# Patient Record
(demographics unavailable — no encounter records)

---

## 2025-04-28 NOTE — PHYSICAL EXAM
[Alert] : alert [No Acute Distress] : no acute distress [Normocephalic] : normocephalic [Conjunctivae with no discharge] : conjunctivae with no discharge [PERRL] : PERRL [EOMI Bilateral] : EOMI bilateral [Auricles Well Formed] : auricles well formed [Clear Tympanic membranes with present light reflex and bony landmarks] : clear tympanic membranes with present light reflex and bony landmarks [No Discharge] : no discharge [Nares Patent] : nares patent [Pink Nasal Mucosa] : pink nasal mucosa [Palate Intact] : palate intact [Nonerythematous Oropharynx] : nonerythematous oropharynx [Supple, full passive range of motion] : supple, full passive range of motion [No Palpable Masses] : no palpable masses [Symmetric Chest Rise] : symmetric chest rise [Clear to Auscultation Bilaterally] : clear to auscultation bilaterally [Regular Rate and Rhythm] : regular rate and rhythm [Normal S1, S2 present] : normal S1, S2 present [No Murmurs] : no murmurs [+2 Femoral Pulses] : +2 femoral pulses [Soft] : soft [NonTender] : non tender [Non Distended] : non distended [Normoactive Bowel Sounds] : normoactive bowel sounds [No Hepatomegaly] : no hepatomegaly [No Splenomegaly] : no splenomegaly [No Abnormal Lymph Nodes Palpated] : no abnormal lymph nodes palpated [No pain or deformities with palpation of bone, muscles, joints] : no pain or deformities with palpation of bone, muscles, joints [No Rash or Lesions] : no rash or lesions

## 2025-04-28 NOTE — DISCUSSION/SUMMARY
[Continue Regimen] : feeding [Anticipatory Guidance Given] : Anticipatory guidance addressed as per the history of present illness section [No Vaccines] : no vaccines needed [Normal Growth] : growth [Normal Development] : development [None] : No known medical problems [No Elimination Concerns] : elimination [No Feeding Concerns] : feeding [No Skin Concerns] : skin [Normal Sleep Pattern] : sleep [School Readiness] : school readiness [Mental Health] : mental health [Nutrition and Physical Activity] : nutrition and physical activity [Oral Health] : oral health [Safety] : safety [No Medications] : ~He/She~ is not on any medications [Patient] : patient [Full Activity without restrictions including Physical Education & Athletics] : Full Activity without restrictions including Physical Education & Athletics [FreeTextEntry1] : Continue balanced diet with all food groups. Brush teeth twice a day with toothbrush. Recommend visit to dentist. As per car seat 's guidelines, use forward-facing booster seat until child reaches highest weight/height for seat. Child needs to ride in a belt-positioning booster seat until  4 feet 9 inches has been reached and are between 8 and 12 years of age. Put child to sleep in own bed. Help child to maintain consistent daily routines and sleep schedule.  discussed. Ensure home is safe. Teach child about personal safety. Use consistent, positive discipline. Read aloud to child. Limit screen time to no more than 2 hours per day.

## 2025-04-28 NOTE — HISTORY OF PRESENT ILLNESS
[Mother] : mother [1% ___ oz/d] : consumes [unfilled] oz of 1%  milk per day [Fruit] : fruit [Vegetables] : vegetables [Meat] : meat [Grains] : grains [Dairy] : dairy [Normal] : Normal [Brushing teeth] : Brushing teeth [Yes] : Patient goes to dentist yearly [Vitamin] : Primary Fluoride Source: Vitamin [Playtime (60 min/d)] : Playtime 60 min a day [< 2 hrs of screen time] : Less than 2 hrs of screen time [TV in bedroom] : TV in bedroom [Appropiate parent-child-sibling interaction] : Appropriate parent-child-sibling interaction [Child Cooperates] : Child cooperates [Parent has appropriate responses to behavior] : Parent has appropriate responses to behavior [Grade ___] : Grade [unfilled] [No difficulties with Homework] : No difficulties with homework [Adequate performance] : Adequate performance [Adequate attention] : Adequate attention [No] : Not at  exposure [Water heater temperature set at <120 degrees F] : Water heater temperature set at <120 degrees F [Car seat in back seat] : Car seat in back seat [Carbon Monoxide Detectors] : Carbon monoxide detectors [Smoke Detectors] : Smoke detectors [Supervised outdoor play] : Supervised outdoor play [Exposure to electronic nicotine delivery system] : No exposure to electronic nicotine delivery system [Up to date] : Up to date [FreeTextEntry7] : 6 yr well

## 2025-06-23 NOTE — HISTORY OF PRESENT ILLNESS
[de-identified] : per mom patient has been complaining of sore throat  [FreeTextEntry6] : No fever Sore throat x 1 week Eating less and only wanting to eat soft or cold foods this past week No Cough, runny nose, nasal congestion No chest pain or SOB No vomiting, no diarrhea normal UOP No known covid contacts

## 2025-06-23 NOTE — DISCUSSION/SUMMARY
[FreeTextEntry1] : Symptomatic treatment of fever and/or pain discussed Stat strep test ordered Throat culture, if POSITIVE, give Amoxicillin 400/5 1 tsp BID x 10 days Hydrate well Handwashing and infection control discussed Return to office if symptoms persist, worsen or febrile

## 2025-06-23 NOTE — PHYSICAL EXAM
[NL] : warm, clear [Enlarged Tonsils] : tonsils not enlarged [Exudate] : no exudate [de-identified] : mild erythema

## 2025-06-28 NOTE — HISTORY OF PRESENT ILLNESS
[de-identified] : Per mom has been having a hard time swallowing for past 2 weeks. Per pt no pain.  [FreeTextEntry6] : Dysphagia x 2 weeks Complains that big pieces of food are hard to swallow, no issue with liquids Denies injury to mouth or throat Lost 1 lb since last visit No fever No ear pain, No nasal congestion, no sore throat No cough, No wheezing No vomiting, No diarrhea No change in activity or energy level per mom

## 2025-06-28 NOTE — DISCUSSION/SUMMARY
[FreeTextEntry1] : Symptomatic treatment  Stat strep test ordered Throat culture, if POSITIVE, give Amoxicillin 500 mg BID x 10 days Handwashing and infection control discussed Return to office if febrile > 48 hours or if symptoms get worse Go to ER if unable to come to the office or during after hours, parent encouraged to call service first before doing so. Recheck prn ENT referral sent

## 2025-06-28 NOTE — PHYSICAL EXAM
[Erythematous Oropharynx] : erythematous oropharynx [Warm, Well Perfused x4] : warm, well perfused x4 [NL] : warm, clear [Enlarged Tonsils] : tonsils not enlarged [Vesicles] : no vesicles [Exudate] : no exudate [Ulcerative Lesions] : no ulcerative lesions [Palate petechiae] : palate without petechiae

## 2025-07-07 NOTE — HISTORY OF PRESENT ILLNESS
[de-identified] : was seen at [facility] on 7/7/25, trouble swallowing, and stomach pain, [facility] dx Coxsackie, strep was negative  [FreeTextEntry6] : 3 weeks ago started with problems swallowing solids. Mom had been giving protein shakes. He initially said he couldn't swallow but recently has complained of abdominal pain when swallowing.  Seems to wax and wane. Has lost 4 lbs since start of symptoms.  Sudden onset last night at midnight- went to ER. PO challenge normal and sent home.

## 2025-07-07 NOTE — DISCUSSION/SUMMARY
[FreeTextEntry1] : referral to GI placed- already has ENT appt.  Will defer BW to GI.  ER precautions discussed.

## 2025-07-07 NOTE — REVIEW OF SYSTEMS
[Appetite Changes] : appetite changes [Intolerance to feeds] : intolerance to feeds [Abdominal Pain] : abdominal pain [Negative] : Genitourinary

## 2025-07-08 NOTE — REASON FOR VISIT
[Initial Consultation] : an initial consultation for [FreeTextEntry2] : difficulty swallowing and weight loss

## 2025-07-08 NOTE — CONSULT LETTER
[Dear  ___] : Dear  [unfilled], [Consult Letter:] : I had the pleasure of evaluating your patient, [unfilled]. [Please see my note below.] : Please see my note below. [Consult Closing:] : Thank you very much for allowing me to participate in the care of this patient.  If you have any questions, please do not hesitate to contact me. [Sincerely,] : Sincerely, [FreeTextEntry3] : Destiny Villaseñor MD Attending Physician, Pediatric Gastroenterology [facility]

## 2025-07-08 NOTE — HISTORY OF PRESENT ILLNESS
[de-identified] : Frankie De Los Santos is a 6 year old male referred by Dr. Gaston for evaluation of difficulty swallowing. His mother notes that 3 weeks ago after drinking protein shakes, he had had difficulty swallowing and stating he is full. He has been eating less and needing smaller sized food. He has not had odynophagia, drooling, sore throat, dyspnea, dysphonia. He has not fever. He had cavity that was addressed via pulpotomy by dentist on 6/19. He had pediatrician evaluation on 6/30 with repeat negative strep testing. He was seen at urgent care this week due to excessive saliva and was unable to swallow water. He was given oral steroid liquid and was unable to tolerate, so proceeded to outside Emergency Room. He was tolerating small amounts of water and was referred to gastroenterologist. He has been tolerating his preferred liquids. He has been eating his preferred foods. He has gastroenterologist appointment today

## 2025-07-08 NOTE — HISTORY OF PRESENT ILLNESS
[de-identified] : 5yo M no medical history here for evaluation of dysphagia   Was in usual state of health until Nancy 15, had dental pain and said he couldn't swallow Went to dentist 6/19, had very deep cavity, got local anesthesia Was eating soft foods initially because he was recovering from dental procedure No coughing. choking or gagging No nausea or vomiting  No food impaction No abdominal pain  Says he's full  Since that time, for the last 2-3 weeks, patient has been reporting that he "can't swallow" prompting multiple evaluation with PCP, ENT, ED. Because of patient's insistence, mother has been feeding him multiple smoothing and milkshakes as well as ice cream for much of the day. At some points she has also used a syringe because of refusal to swallow. She has also spent a great deal of time blending his food. Yesterday he ate ice cream, smoothing and half a tray of crunchy french fries and sliced plums and cheetos. Today, ate cheddar bunnies in office.

## 2025-07-08 NOTE — CONSULT LETTER
[Dear  ___] : Dear  [unfilled], [Consult Letter:] : I had the pleasure of evaluating your patient, [unfilled]. [Please see my note below.] : Please see my note below. [Consult Closing:] : Thank you very much for allowing me to participate in the care of this patient.  If you have any questions, please do not hesitate to contact me. [Sincerely,] : Sincerely, [FreeTextEntry3] : Destiny Villaseñor MD Attending Physician, Pediatric Gastroenterology [facility]

## 2025-07-08 NOTE — HISTORY OF PRESENT ILLNESS
[de-identified] : Frankie De Los Santos is a 6 year old male referred by Dr. Gaston for evaluation of difficulty swallowing. His mother notes that 3 weeks ago after drinking protein shakes, he had had difficulty swallowing and stating he is full. He has been eating less and needing smaller sized food. He has not had odynophagia, drooling, sore throat, dyspnea, dysphonia. He has not fever. He had cavity that was addressed via pulpotomy by dentist on 6/19. He had pediatrician evaluation on 6/30 with repeat negative strep testing. He was seen at urgent care this week due to excessive saliva and was unable to swallow water. He was given oral steroid liquid and was unable to tolerate, so proceeded to outside Emergency Room. He was tolerating small amounts of water and was referred to gastroenterologist. He has been tolerating his preferred liquids. He has been eating his preferred foods. He has gastroenterologist appointment today

## 2025-07-08 NOTE — ASSESSMENT
[FreeTextEntry1] : Frankie De Los Santos presents for evaluation of difficulty swallowing and weight loss over the past 3 weeks. He has been evaluated multiple times by urgent care, pediatrician and outside ED with normal testing. Flexible laryngoscopy today is wnl. Given start of symptoms after drinking protein shake, suspect possible GI issue leading to his symptoms. he is seeing pediatric GI today and will defer to them.  - f/u prn

## 2025-07-08 NOTE — ASSESSMENT
[Educated Patient & Family about Diagnosis] : educated the patient and family about the diagnosis [FreeTextEntry1] : 5yo M no medical history here for evaluation of acute dysphagia following dental procedure.  DDx is broad and includes infectious, inflammatory or immune mediated, disorder of gut brain interaction, less likely medication induced. Suspect strong behavioral component as patient is able to chew and swallow cheetos, cheddar bunnies, crunch french fries and eat ice cream for much of the day. Strongly encourage resumption of regular diet with patient self feeding. Discontinue syringe feeding and blenderized meals.   Mother to call with update in one week. If persistent dysphagia, can consider feeding evaluation and labs for organic etiologies.

## 2025-07-08 NOTE — HISTORY OF PRESENT ILLNESS
[de-identified] : 7yo M no medical history here for evaluation of dysphagia   Was in usual state of health until Nancy 15, had dental pain and said he couldn't swallow Went to dentist 6/19, had very deep cavity, got local anesthesia Was eating soft foods initially because he was recovering from dental procedure No coughing. choking or gagging No nausea or vomiting  No food impaction No abdominal pain  Says he's full  Since that time, for the last 2-3 weeks, patient has been reporting that he "can't swallow" prompting multiple evaluation with PCP, ENT, ED. Because of patient's insistence, mother has been feeding him multiple smoothing and milkshakes as well as ice cream for much of the day. At some points she has also used a syringe because of refusal to swallow. She has also spent a great deal of time blending his food. Yesterday he ate ice cream, smoothing and half a tray of crunchy french fries and sliced plums and cheetos. Today, ate cheddar bunnies in office.

## 2025-07-08 NOTE — CONSULT LETTER
[Dear  ___] : Dear  [unfilled], [Consult Letter:] : I had the pleasure of evaluating your patient, [unfilled]. [Please see my note below.] : Please see my note below. [Consult Closing:] : Thank you very much for allowing me to participate in the care of this patient.  If you have any questions, please do not hesitate to contact me. [Sincerely,] : Sincerely, [FreeTextEntry3] : Jered Benítez MD